# Patient Record
Sex: FEMALE | Race: WHITE | NOT HISPANIC OR LATINO | Employment: FULL TIME | URBAN - METROPOLITAN AREA
[De-identification: names, ages, dates, MRNs, and addresses within clinical notes are randomized per-mention and may not be internally consistent; named-entity substitution may affect disease eponyms.]

---

## 2017-11-29 ENCOUNTER — TELEPHONE (OUTPATIENT)
Dept: RHEUMATOLOGY | Facility: CLINIC | Age: 62
End: 2017-11-29

## 2017-11-29 NOTE — TELEPHONE ENCOUNTER
----- Message from Kelli Espino sent at 11/28/2017  4:34 PM CST -----  Contact: PT  Pt would like to see Dr Ma,new pt that was referred by a pt,but since she is out willing to see anyone. Pt prefer 12/26 please for BACK FULL ARTHRITIS AND KNEE HAS ARTHRITIS..[phone] (home)

## 2017-11-29 NOTE — TELEPHONE ENCOUNTER
Spoke to pt, offered appts with Dr. Ma, Dr. Leon and Dr. Anderson. Pt does not want to wait as she has met her deductible for the year. She advised she will call around to see if she can get in with another rheumatologist. No further questions.

## 2018-06-13 ENCOUNTER — TELEPHONE (OUTPATIENT)
Dept: INTERNAL MEDICINE | Facility: CLINIC | Age: 63
End: 2018-06-13

## 2019-03-15 ENCOUNTER — TELEPHONE (OUTPATIENT)
Dept: RHEUMATOLOGY | Facility: CLINIC | Age: 64
End: 2019-03-15

## 2019-03-15 NOTE — TELEPHONE ENCOUNTER
----- Message from Geoff Anderson sent at 3/15/2019  3:19 PM CDT -----  Contact: same  Patient called in and stated she would like to set up a new patient appt with Dr. Ma.  Patient has been diagnosed with possible Lupus.  Biopsy was done by a dermatologist who informed patient she needed a total work up.    Patient call back number is [phone]

## 2019-03-15 NOTE — TELEPHONE ENCOUNTER
Contacted patient and scheduled new patient appointment at first available. Patient informed office that she is biopsy proven lupus. Nurse requested records to be faxed to office from dermatology. Patient will have records faxed next week. Nurse informed patient that once Dr Ma reviews records may contact to move appointment up from September. Patient verbalized understanding.

## 2019-09-19 ENCOUNTER — LAB VISIT (OUTPATIENT)
Dept: LAB | Facility: HOSPITAL | Age: 64
End: 2019-09-19
Attending: INTERNAL MEDICINE
Payer: COMMERCIAL

## 2019-09-19 ENCOUNTER — OFFICE VISIT (OUTPATIENT)
Dept: RHEUMATOLOGY | Facility: CLINIC | Age: 64
End: 2019-09-19
Payer: COMMERCIAL

## 2019-09-19 VITALS
WEIGHT: 200.06 LBS | HEART RATE: 69 BPM | SYSTOLIC BLOOD PRESSURE: 151 MMHG | DIASTOLIC BLOOD PRESSURE: 86 MMHG | BODY MASS INDEX: 31.4 KG/M2 | HEIGHT: 67 IN | RESPIRATION RATE: 18 BRPM

## 2019-09-19 DIAGNOSIS — J90 PLEURAL EFFUSION: ICD-10-CM

## 2019-09-19 DIAGNOSIS — J84.9 ILD (INTERSTITIAL LUNG DISEASE): ICD-10-CM

## 2019-09-19 DIAGNOSIS — R76.8 POSITIVE ANA (ANTINUCLEAR ANTIBODY): ICD-10-CM

## 2019-09-19 DIAGNOSIS — L93.0 LUPUS ERYTHEMATOSUS, UNSPECIFIED FORM: ICD-10-CM

## 2019-09-19 DIAGNOSIS — J84.9 INTERSTITIAL LUNG DISEASE: ICD-10-CM

## 2019-09-19 DIAGNOSIS — L93.0 LUPUS ERYTHEMATOSUS, UNSPECIFIED FORM: Primary | ICD-10-CM

## 2019-09-19 LAB
C3 SERPL-MCNC: 183 MG/DL (ref 50–180)
C4 SERPL-MCNC: 35 MG/DL (ref 11–44)
CK SERPL-CCNC: 51 U/L (ref 20–180)
THYROGLOB AB SERPL IA-ACNC: <4 IU/ML (ref 0–3.9)

## 2019-09-19 PROCEDURE — 86038 ANTINUCLEAR ANTIBODIES: CPT

## 2019-09-19 PROCEDURE — 86160 COMPLEMENT ANTIGEN: CPT

## 2019-09-19 PROCEDURE — 82550 ASSAY OF CK (CPK): CPT

## 2019-09-19 PROCEDURE — 86147 CARDIOLIPIN ANTIBODY EA IG: CPT | Mod: 59

## 2019-09-19 PROCEDURE — 83516 IMMUNOASSAY NONANTIBODY: CPT

## 2019-09-19 PROCEDURE — 86162 COMPLEMENT TOTAL (CH50): CPT

## 2019-09-19 PROCEDURE — 99999 PR PBB SHADOW E&M-EST. PATIENT-LVL IV: CPT | Mod: PBBFAC,,, | Performed by: INTERNAL MEDICINE

## 2019-09-19 PROCEDURE — 86235 NUCLEAR ANTIGEN ANTIBODY: CPT

## 2019-09-19 PROCEDURE — 86235 NUCLEAR ANTIGEN ANTIBODY: CPT | Mod: 59

## 2019-09-19 PROCEDURE — 85613 RUSSELL VIPER VENOM DILUTED: CPT

## 2019-09-19 PROCEDURE — 99999 PR PBB SHADOW E&M-EST. PATIENT-LVL IV: ICD-10-PCS | Mod: PBBFAC,,, | Performed by: INTERNAL MEDICINE

## 2019-09-19 PROCEDURE — 86225 DNA ANTIBODY NATIVE: CPT

## 2019-09-19 PROCEDURE — 86160 COMPLEMENT ANTIGEN: CPT | Mod: 59

## 2019-09-19 PROCEDURE — 86800 THYROGLOBULIN ANTIBODY: CPT

## 2019-09-19 PROCEDURE — 99205 OFFICE O/P NEW HI 60 MIN: CPT | Mod: S$GLB,,, | Performed by: INTERNAL MEDICINE

## 2019-09-19 PROCEDURE — 99205 PR OFFICE/OUTPT VISIT, NEW, LEVL V, 60-74 MIN: ICD-10-PCS | Mod: S$GLB,,, | Performed by: INTERNAL MEDICINE

## 2019-09-19 PROCEDURE — 86146 BETA-2 GLYCOPROTEIN ANTIBODY: CPT | Mod: 59

## 2019-09-19 RX ORDER — DICLOFENAC SODIUM 75 MG/1
75 TABLET, DELAYED RELEASE ORAL DAILY
COMMUNITY
Start: 2016-10-31 | End: 2019-12-19

## 2019-09-19 RX ORDER — UBIDECARENONE 75 MG
1 CAPSULE ORAL DAILY
COMMUNITY
Start: 2001-01-01

## 2019-09-19 RX ORDER — ACETAMINOPHEN AND PHENYLEPHRINE HCL 325; 5 MG/1; MG/1
1 TABLET ORAL DAILY
COMMUNITY
Start: 2001-01-01

## 2019-09-19 RX ORDER — LIDOCAINE HCL 4 %
1 CREAM (GRAM) TOPICAL DAILY
COMMUNITY
Start: 2001-01-01

## 2019-09-19 RX ORDER — DICLOFENAC SODIUM 50 MG/1
50 TABLET, DELAYED RELEASE ORAL 2 TIMES DAILY
Qty: 60 TABLET | Refills: 4 | Status: SHIPPED | OUTPATIENT
Start: 2019-09-19 | End: 2019-12-19

## 2019-09-19 RX ORDER — GABAPENTIN 300 MG/1
300 CAPSULE ORAL 4 TIMES DAILY
COMMUNITY

## 2019-09-19 RX ORDER — ESTRADIOL 0.1 MG/G
CREAM VAGINAL
COMMUNITY
Start: 2013-02-26

## 2019-09-19 RX ORDER — PYRIDOXINE HCL (VITAMIN B6) 100 MG
1 TABLET ORAL DAILY
COMMUNITY
Start: 2001-01-01

## 2019-09-19 RX ORDER — HYDROCODONE BITARTRATE AND ACETAMINOPHEN 10; 325 MG/1; MG/1
1 TABLET ORAL 4 TIMES DAILY
COMMUNITY
Start: 2016-10-31

## 2019-09-19 RX ORDER — BUPROPION HYDROCHLORIDE 150 MG/1
150 TABLET, EXTENDED RELEASE ORAL DAILY
COMMUNITY
Start: 2015-02-09

## 2019-09-19 RX ORDER — ATORVASTATIN CALCIUM 20 MG/1
20 TABLET, FILM COATED ORAL DAILY
COMMUNITY
Start: 2014-10-09

## 2019-09-19 ASSESSMENT — ROUTINE ASSESSMENT OF PATIENT INDEX DATA (RAPID3)
TOTAL RAPID3 SCORE: 5.44
PATIENT GLOBAL ASSESSMENT SCORE: 6
PAIN SCORE: 6
MDHAQ FUNCTION SCORE: 1.3
PSYCHOLOGICAL DISTRESS SCORE: 4.4

## 2019-09-19 NOTE — PROGRESS NOTES
"Subjective:          Chief Complaint: Chastity Robles is a 64 y.o. female who had concerns including Lupus.    HPI:    Patient is a 64-year-old female she is history of biopsy-proven lupus with [facility] of which I do not have the pathology report here today for this consultation.  She has no serologies available to me to review  Patient developed rash 2/2019 with rash ears, face and neck. Non puritic, erythematous. Nonpalpable. She did have bx told "Lupus"   Given topical steroids did resolve and not returned.   + NICKO 1:80.   Patient has recently noted to have pleural effusion pending eval with Pulmonology.   She notes stiffness worse in AM, elbows, hands, knees,   She notes hands seem to progressively get worse with use. ()  Patient denies weight loss, rashes, dry eye, dry mouth, nasal or palatal ulcerations,  lymphadenopathy, Raynaud's, hx of DVT/miscarriages, psoriasis or family hx of psoriasis, rashes, serositis, anemia or other constitutional symptoms.      Follows with Dr. Garcia for chronic back pain- Gabapentin, Hydrocodone. Some procedures. cervical surgery as of 12/23/2016  10/2016 Thoracic surgery.  with Dr. Kellogg.    Bilateral carpal tunnel release as of 2013  Right. CTR -2017   EMG/NCS negative but residual numbness/pins and needles of the RUE.    Current medications include Voltaren 75 mg  1 daily Omega 3 fatty acids.    REVIEW OF SYSTEMS:    Review of Systems   Constitutional: Positive for malaise/fatigue. Negative for fever and weight loss.   HENT: Negative for sore throat.    Eyes: Negative for double vision, photophobia and redness.   Respiratory: Negative for cough, shortness of breath and wheezing.    Cardiovascular: Negative for chest pain, palpitations and orthopnea.   Gastrointestinal: Negative for abdominal pain, constipation and diarrhea.   Genitourinary: Negative for dysuria, hematuria and urgency.   Musculoskeletal: Positive for back pain, joint pain, myalgias and neck " pain.   Skin: Negative for rash.   Neurological: Negative for dizziness, tingling, focal weakness and headaches.   Endo/Heme/Allergies: Does not bruise/bleed easily.   Psychiatric/Behavioral: Negative for depression, hallucinations and suicidal ideas.               Objective:            Past Medical History:   Diagnosis Date    Back pain     Depression     Hyperlipidemia     Panic attack      Family History   Problem Relation Age of Onset    Ovarian cancer Mother     Colon cancer Mother     COPD Mother     Throat cancer Father     Parkinsonism Father     Alzheimer's disease Father     Pancreatic cancer Brother      Social History     Tobacco Use    Smoking status: Former Smoker     Types: Cigarettes     Last attempt to quit: 10/1/2016     Years since quittin.9    Smokeless tobacco: Never Used   Substance Use Topics    Alcohol use: Yes     Alcohol/week: 0.6 oz     Types: 1 Glasses of wine per week     Frequency: Monthly or less     Drinks per session: 1 or 2     Binge frequency: Never    Drug use: Never         Current Outpatient Medications on File Prior to Visit   Medication Sig Dispense Refill    atorvastatin (LIPITOR) 20 MG tablet Take 20 mg by mouth once daily.      biotin 10,000 mcg Cap Take 1 tablet by mouth once daily.      buPROPion (WELLBUTRIN SR) 150 MG TBSR 12 hr tablet Take 150 mg by mouth once daily.      cranberry extract-vitamin C 250-60 mg Cap Take 1 tablet by mouth once daily.      cyanocobalamin (B-12 DOTS) 500 MCG tablet Take 1 tablet by mouth once daily.      diclofenac (VOLTAREN) 75 MG EC tablet Take 75 mg by mouth once daily.      esomeprazole magnesium (NEXIUM ORAL) Take 20 mg by mouth once daily.      estradiol (ESTRACE) 0.01 % (0.1 mg/gram) vaginal cream       HYDROcodone-acetaminophen (NORCO)  mg per tablet Take 1 tablet by mouth 4 (four) times daily.      multivit with minerals/lutein (MULTIVITAMIN 50 PLUS ORAL) Take 1 tablet by mouth once daily.       pyridoxine, vitamin B6, (B-6) 100 MG Tab Take 1 tablet by mouth once daily.      FLAXSEED OIL-OMEGA 3,6,9 ORAL Take 1 tablet by mouth once daily.      PAPAYA ENZYME ORAL Take 1 tablet by mouth once daily.       No current facility-administered medications on file prior to visit.        Vitals:    09/19/19 0856   BP: (!) 151/86   Pulse: 69   Resp: 18       Physical Exam:    Physical Exam   Constitutional: She is oriented to person, place, and time. She appears well-developed and well-nourished.   HENT:   Head: Normocephalic and atraumatic.   Mouth/Throat: Oropharynx is clear and moist.   Eyes: Pupils are equal, round, and reactive to light. EOM are normal.   Neck: Normal range of motion.   Cardiovascular: Normal rate, regular rhythm and normal heart sounds.   Pulmonary/Chest: Effort normal and breath sounds normal.   Musculoskeletal:        Right shoulder: She exhibits normal range of motion, no tenderness and no swelling.        Left shoulder: She exhibits normal range of motion, no tenderness and no swelling.        Right elbow: She exhibits normal range of motion and no swelling. No tenderness found.        Left elbow: She exhibits normal range of motion and no swelling. No tenderness found.        Right wrist: She exhibits normal range of motion, no tenderness and no swelling.        Left wrist: She exhibits normal range of motion, no tenderness and no swelling.        Right knee: She exhibits normal range of motion and no swelling. No tenderness found.        Left knee: She exhibits normal range of motion and no swelling. No tenderness found.        Right hand: She exhibits normal range of motion, no tenderness and no swelling.        Left hand: She exhibits normal range of motion, no tenderness and no swelling.        Right foot: There is normal range of motion, no tenderness and no swelling.        Left foot: There is normal range of motion, no tenderness and no swelling.   Neurological: She is alert and  oriented to person, place, and time.   Skin: Skin is warm and dry.   Psychiatric: She has a normal mood and affect. Her behavior is normal.             Assessment:       Encounter Diagnoses   Name Primary?    Lupus erythematosus, unspecified form Yes    Positive NICKO (antinuclear antibody)     Pleural effusion     ILD (interstitial lung disease)     Interstitial lung disease           Plan:        Lupus erythematosus, unspecified form  -     CT Chest Without Contrast; Future; Expected date: 09/19/2019  -     NICKO Screen w/Reflex; Future; Expected date: 09/19/2019  -     Anti Sm/RNP Antibody; Future; Expected date: 09/19/2019  -     Anti-DNA antibody, double-stranded; Future; Expected date: 09/19/2019  -     Anti-Histone Antibody; Future; Expected date: 09/19/2019  -     Anti-Smith antibody; Future; Expected date: 09/19/2019  -     Anti-thyroglobulin antibody; Future; Expected date: 09/19/2019  -     Beta-2 glycoprotein Abs (IgA, IgG, IgM); Future; Expected date: 09/19/2019  -     C3 complement; Future; Expected date: 09/19/2019  -     C4 complement; Future; Expected date: 09/19/2019  -     Cardiolipin antibody; Future; Expected date: 09/19/2019  -     CK; Future; Expected date: 09/19/2019  -     Complement, total; Future; Expected date: 09/19/2019  -     DRVVT; Future; Expected date: 09/19/2019  -     Sjogrens syndrome-A extractable nuclear antibody; Future; Expected date: 09/19/2019  -     Sjogrens syndrome-B extractable nuclear antibody; Future; Expected date: 09/19/2019    Positive NICKO (antinuclear antibody)  -     NICKO Screen w/Reflex; Future; Expected date: 09/19/2019  -     Anti Sm/RNP Antibody; Future; Expected date: 09/19/2019  -     Anti-DNA antibody, double-stranded; Future; Expected date: 09/19/2019  -     Anti-Histone Antibody; Future; Expected date: 09/19/2019  -     Anti-Smith antibody; Future; Expected date: 09/19/2019  -     Anti-thyroglobulin antibody; Future; Expected date: 09/19/2019  -      Beta-2 glycoprotein Abs (IgA, IgG, IgM); Future; Expected date: 09/19/2019  -     C3 complement; Future; Expected date: 09/19/2019  -     C4 complement; Future; Expected date: 09/19/2019  -     Cardiolipin antibody; Future; Expected date: 09/19/2019  -     CK; Future; Expected date: 09/19/2019  -     Complement, total; Future; Expected date: 09/19/2019  -     DRVVT; Future; Expected date: 09/19/2019  -     Sjogrens syndrome-A extractable nuclear antibody; Future; Expected date: 09/19/2019  -     Sjogrens syndrome-B extractable nuclear antibody; Future; Expected date: 09/19/2019    Pleural effusion    ILD (interstitial lung disease)    Interstitial lung disease  -     CT Chest Without Contrast; Future; Expected date: 09/19/2019    Other orders  -     diclofenac (VOLTAREN) 50 MG EC tablet; Take 1 tablet (50 mg total) by mouth 2 (two) times daily.  Dispense: 60 tablet; Refill: 4      Likely all osteoarthritis will complete the workup for systemic lupus  Patient had a pleural effusion but no no pleuritic pain a concern would be for interstitial lung disease during workup of lupus so we will order the CT of chest she has an appointment with pulmonology at [place] I have her do it there for final review by the pulmonologist.  Today she only has left-sided chest wall tenderness to palpation but not with respiratory phase    I just her diclofenac to 50 mg twice daily for better control of her arthritis another consideration would be to add in Cymbalta for just widespread osteoarthritis both from her back as well as her peripheral joints.  Pattern is not consistent with inflammatory arthritis or think this will be a low index of suspicion for lupus s  Follow up in about 4 months (around 1/19/2020).      60min consultation with greater than 50% spent in counseling, chart review and coordination of care. All questions answered.  Thank you for allowing me to participate in the care of this very pleasant  patient.            Addendum:   CT Chest [facility] 10/3/19  IMPRESSION:     1. Clear lungs.  2. Partially visualized fluid density dilatation of the left renal collecting system, nonspecific but could be due to a peripelvic cyst or UPJ obstruction. Obstructive uropathy felt less likely. Correlate clinically.  3. Cholelithiasis without gallbladder distention.  4. Old left lateral rib fractures.  5. Multilevel thoracic posterior fusion.

## 2019-09-20 LAB
ANA SER QL IF: NORMAL
ANTI SM ANTIBODY: 1.45 EU (ref 0–19.99)
ANTI SM/RNP ANTIBODY: 18.82 EU (ref 0–19.99)
ANTI-SM INTERPRETATION: NEGATIVE
ANTI-SM/RNP INTERPRETATION: NEGATIVE
ANTI-SSA ANTIBODY: 0.46 EU (ref 0–19.99)
ANTI-SSA INTERPRETATION: NEGATIVE
ANTI-SSB ANTIBODY: 0.6 EU (ref 0–19.99)
ANTI-SSB INTERPRETATION: NEGATIVE
DSDNA AB SER-ACNC: NORMAL [IU]/ML

## 2019-09-21 LAB
B2 GLYCOPROT1 IGA SER QL: <9 SAU
B2 GLYCOPROT1 IGG SER QL: <9 SGU
B2 GLYCOPROT1 IGM SER QL: <9 SMU
CARDIOLIPIN IGG SER IA-ACNC: <9.4 GPL (ref 0–14.99)
CARDIOLIPIN IGM SER IA-ACNC: <9.4 MPL (ref 0–12.49)

## 2019-09-22 LAB — HISTONE IGG SER IA-ACNC: 1.4 UNITS (ref 0–0.9)

## 2019-09-23 LAB — CH50 SERPL-ACNC: 97 U/ML (ref 42–95)

## 2019-09-24 ENCOUNTER — TELEPHONE (OUTPATIENT)
Dept: RHEUMATOLOGY | Facility: CLINIC | Age: 64
End: 2019-09-24

## 2019-09-24 LAB — LA PPP-IMP: NEGATIVE

## 2019-09-24 NOTE — TELEPHONE ENCOUNTER
Pt advised that all results would be explained when they all come in. Pt verbalized understanding.

## 2019-09-24 NOTE — TELEPHONE ENCOUNTER
----- Message from Falguni Loo sent at 9/23/2019  2:31 PM CDT -----  Contact: Chastity  Type: Needs Medical Advice    Who Called:  patient  Best Call Back Number: [phone] (home)   Additional Information: requesting a call back to explain lab results--please advise--thank you

## 2019-10-06 ENCOUNTER — TELEPHONE (OUTPATIENT)
Dept: RHEUMATOLOGY | Facility: CLINIC | Age: 64
End: 2019-10-06

## 2019-10-07 NOTE — TELEPHONE ENCOUNTER
"Please call patient   Reviewed all labs and there is no evidence of systemic lupus appears she has only skin lupus and degenerative arthritis.     I have also see the CT from [facility] and no pleural effusion has resolved.   She does have old left rib fractures noted. I don't have anything about this but the report suggests they are not current.   She has a "fluid density" about the left kidney/ureter notes this is likely benign cyst but want her to f/u with her PCP with this report. I am not sure we received a faxed report but can send to PCP.     OTF Leon            "

## 2019-12-19 ENCOUNTER — OFFICE VISIT (OUTPATIENT)
Dept: RHEUMATOLOGY | Facility: CLINIC | Age: 64
End: 2019-12-19
Payer: COMMERCIAL

## 2019-12-19 VITALS
BODY MASS INDEX: 31.39 KG/M2 | HEART RATE: 73 BPM | WEIGHT: 200 LBS | DIASTOLIC BLOOD PRESSURE: 78 MMHG | HEIGHT: 67 IN | SYSTOLIC BLOOD PRESSURE: 146 MMHG

## 2019-12-19 DIAGNOSIS — M47.24 OSTEOARTHRITIS OF SPINE WITH RADICULOPATHY, THORACIC REGION: ICD-10-CM

## 2019-12-19 DIAGNOSIS — L93.2 CUTANEOUS LUPUS ERYTHEMATOSUS: ICD-10-CM

## 2019-12-19 DIAGNOSIS — M15.9 PRIMARY OSTEOARTHRITIS INVOLVING MULTIPLE JOINTS: Primary | ICD-10-CM

## 2019-12-19 PROCEDURE — 99999 PR PBB SHADOW E&M-EST. PATIENT-LVL III: ICD-10-PCS | Mod: PBBFAC,,, | Performed by: INTERNAL MEDICINE

## 2019-12-19 PROCEDURE — 99215 OFFICE O/P EST HI 40 MIN: CPT | Mod: S$GLB,,, | Performed by: INTERNAL MEDICINE

## 2019-12-19 PROCEDURE — 99999 PR PBB SHADOW E&M-EST. PATIENT-LVL III: CPT | Mod: PBBFAC,,, | Performed by: INTERNAL MEDICINE

## 2019-12-19 PROCEDURE — 99215 PR OFFICE/OUTPT VISIT, EST, LEVL V, 40-54 MIN: ICD-10-PCS | Mod: S$GLB,,, | Performed by: INTERNAL MEDICINE

## 2019-12-19 RX ORDER — DICLOFENAC SODIUM 50 MG/1
50 TABLET, DELAYED RELEASE ORAL 2 TIMES DAILY
Qty: 180 TABLET | Refills: 2 | Status: SHIPPED | OUTPATIENT
Start: 2019-12-19 | End: 2020-12-18

## 2019-12-19 RX ORDER — DICLOFENAC SODIUM 50 MG/1
50 TABLET, DELAYED RELEASE ORAL 2 TIMES DAILY
Qty: 180 TABLET | Refills: 2 | Status: SHIPPED | OUTPATIENT
Start: 2019-12-19 | End: 2019-12-19 | Stop reason: SDUPTHER

## 2019-12-19 NOTE — PROGRESS NOTES
"Subjective:          Chief Complaint: Chastity Robles is a 64 y.o. female who had concerns including Pain (3mth f/u, pain is the same..).    HPI:    Patient is a 64-year-old female she is history of biopsy-proven lupus with [facility] of which I do not have the pathology report here today for this consultation.  She has no serologies available to me to review  Patient developed rash 2/2019 with rash ears, face and neck. Non puritic, erythematous. Nonpalpable. She did have bx told "Lupus"   Given topical steroids did resolve and not returned.   + NICKO 1:80.   Patient has recently noted to have pleural effusion pending eval with Pulmonology.   She notes stiffness worse in AM, elbows, hands, knees,   She notes hands seem to progressively get worse with use. ()  Patient denies weight loss, rashes, dry eye, dry mouth, nasal or palatal ulcerations,  lymphadenopathy, Raynaud's, hx of DVT/miscarriages, psoriasis or family hx of psoriasis, rashes, serositis, anemia or other constitutional symptoms.      Follows with Dr. Garcia for chronic back pain- Gabapentin, Hydrocodone. Some procedures. cervical surgery as of 12/23/2016  10/2016 Thoracic surgery.  with Dr. Kellogg.    Bilateral carpal tunnel release as of 2013  Right. CTR -2017   EMG/NCS negative but residual numbness/pins and needles of the RUE.    Current medications include Voltaren 75 mg  1 daily Omega 3 fatty acids.    REVIEW OF SYSTEMS:    Review of Systems   Constitutional: Positive for malaise/fatigue. Negative for fever and weight loss.   HENT: Negative for sore throat.    Eyes: Negative for double vision, photophobia and redness.   Respiratory: Negative for cough, shortness of breath and wheezing.    Cardiovascular: Negative for chest pain, palpitations and orthopnea.   Gastrointestinal: Negative for abdominal pain, constipation and diarrhea.   Genitourinary: Negative for dysuria, hematuria and urgency.   Musculoskeletal: Positive for back pain, " joint pain, myalgias and neck pain.   Skin: Negative for rash.   Neurological: Negative for dizziness, tingling, focal weakness and headaches.   Endo/Heme/Allergies: Does not bruise/bleed easily.   Psychiatric/Behavioral: Negative for depression, hallucinations and suicidal ideas.               Objective:            Past Medical History:   Diagnosis Date    Back pain     Depression     Hyperlipidemia     Panic attack      Family History   Problem Relation Age of Onset    Ovarian cancer Mother     Colon cancer Mother     COPD Mother     Throat cancer Father     Parkinsonism Father     Alzheimer's disease Father     Pancreatic cancer Brother      Social History     Tobacco Use    Smoking status: Former Smoker     Types: Cigarettes     Last attempt to quit: 10/1/2016     Years since quitting: 3.2    Smokeless tobacco: Never Used   Substance Use Topics    Alcohol use: Yes     Alcohol/week: 1.0 standard drinks     Types: 1 Glasses of wine per week     Frequency: Monthly or less     Drinks per session: 1 or 2     Binge frequency: Never    Drug use: Never         Current Outpatient Medications on File Prior to Visit   Medication Sig Dispense Refill    atorvastatin (LIPITOR) 20 MG tablet Take 20 mg by mouth once daily.      biotin 10,000 mcg Cap Take 1 tablet by mouth once daily.      buPROPion (WELLBUTRIN SR) 150 MG TBSR 12 hr tablet Take 150 mg by mouth once daily.      cranberry extract-vitamin C 250-60 mg Cap Take 1 tablet by mouth once daily.      cyanocobalamin (B-12 DOTS) 500 MCG tablet Take 1 tablet by mouth once daily.      diclofenac (VOLTAREN) 50 MG EC tablet Take 1 tablet (50 mg total) by mouth 2 (two) times daily. 60 tablet 4    diclofenac (VOLTAREN) 75 MG EC tablet Take 75 mg by mouth once daily.      esomeprazole magnesium (NEXIUM ORAL) Take 20 mg by mouth once daily.      estradiol (ESTRACE) 0.01 % (0.1 mg/gram) vaginal cream       FLAXSEED OIL-OMEGA 3,6,9 ORAL Take 1 tablet by mouth  once daily.      gabapentin (NEURONTIN) 300 MG capsule Take 300 mg by mouth 4 (four) times daily.      HYDROcodone-acetaminophen (NORCO)  mg per tablet Take 1 tablet by mouth 4 (four) times daily.      multivit with minerals/lutein (MULTIVITAMIN 50 PLUS ORAL) Take 1 tablet by mouth once daily.      PAPAYA ENZYME ORAL Take 1 tablet by mouth once daily.      pyridoxine, vitamin B6, (B-6) 100 MG Tab Take 1 tablet by mouth once daily.       No current facility-administered medications on file prior to visit.        Vitals:    12/19/19 1132   BP: (!) 146/78   Pulse: 73       Physical Exam:    Physical Exam   Constitutional: She is oriented to person, place, and time. She appears well-developed and well-nourished.   HENT:   Head: Normocephalic and atraumatic.   Mouth/Throat: Oropharynx is clear and moist.   Eyes: Pupils are equal, round, and reactive to light. EOM are normal.   Neck: Normal range of motion.   Cardiovascular: Normal rate, regular rhythm and normal heart sounds.   Pulmonary/Chest: Effort normal and breath sounds normal.   Musculoskeletal:        Right shoulder: She exhibits normal range of motion, no tenderness and no swelling.        Left shoulder: She exhibits normal range of motion, no tenderness and no swelling.        Right elbow: She exhibits normal range of motion and no swelling. No tenderness found.        Left elbow: She exhibits normal range of motion and no swelling. No tenderness found.        Right wrist: She exhibits normal range of motion, no tenderness and no swelling.        Left wrist: She exhibits normal range of motion, no tenderness and no swelling.        Right knee: She exhibits normal range of motion and no swelling. No tenderness found.        Left knee: She exhibits normal range of motion and no swelling. No tenderness found.        Right hand: She exhibits normal range of motion, no tenderness and no swelling.        Left hand: She exhibits normal range of motion, no  tenderness and no swelling.        Right foot: There is normal range of motion, no tenderness and no swelling.        Left foot: There is normal range of motion, no tenderness and no swelling.   Neurological: She is alert and oriented to person, place, and time.   Skin: Skin is warm and dry.   Psychiatric: She has a normal mood and affect. Her behavior is normal.             Assessment:       Encounter Diagnosis   Name Primary?    Primary osteoarthritis involving multiple joints Yes          Plan:        Primary osteoarthritis involving multiple joints    Osteoarthritis of spine with radiculopathy, thoracic region    Cutaneous lupus erythematosus    Other orders  -     Discontinue: diclofenac (VOLTAREN) 50 MG EC tablet; Take 1 tablet (50 mg total) by mouth 2 (two) times daily.  Dispense: 180 tablet; Refill: 2  -     diclofenac (VOLTAREN) 50 MG EC tablet; Take 1 tablet (50 mg total) by mouth 2 (two) times daily.  Dispense: 180 tablet; Refill: 2      OA.   Work up for Systemic lupus was negative.   CT chest w/o pleural effusion,IL, nodules no clinical s/sx;  she did see Pulmonology no need for F/U.   Cutaneous lupus: skin with topical CSs    Doing well with Voltaren 50mg BID  Next though would be to add Cymbalta   She is moving to [place] and I do not see a need for f/u with a Rheumatologist she can f/u with new PCP for above medications.   Discussed natural progression of OA and management strategies including exercises, trial with Motrin and Tylenol for now over the counter      Follow up in about 4 months (around 4/19/2020).      40min consultation with greater than 50% spent in counseling, chart review and coordination of care. All questions answered.  Thank you for allowing me to participate in the care of this very pleasant patient.

## 2019-12-19 NOTE — PATIENT INSTRUCTIONS
Continue- Voltaren 50mg twice daily labs (renal) every 6 months.   If having any breakthrough arthritis next step would be Cymbalta.     No SLE/inflammatory arthrits  Cutaneous LE only.